# Patient Record
Sex: FEMALE | Race: WHITE
[De-identification: names, ages, dates, MRNs, and addresses within clinical notes are randomized per-mention and may not be internally consistent; named-entity substitution may affect disease eponyms.]

---

## 2023-01-21 ENCOUNTER — HOSPITAL ENCOUNTER (EMERGENCY)
Dept: HOSPITAL 54 - ER | Age: 38
Discharge: HOME | End: 2023-01-21
Payer: MEDICAID

## 2023-01-21 VITALS — SYSTOLIC BLOOD PRESSURE: 139 MMHG | DIASTOLIC BLOOD PRESSURE: 88 MMHG

## 2023-01-21 VITALS — WEIGHT: 165 LBS | HEIGHT: 67 IN | BODY MASS INDEX: 25.9 KG/M2

## 2023-01-21 DIAGNOSIS — F17.210: ICD-10-CM

## 2023-01-21 DIAGNOSIS — G43.909: Primary | ICD-10-CM

## 2023-01-21 DIAGNOSIS — Z88.8: ICD-10-CM

## 2023-01-21 DIAGNOSIS — Z88.5: ICD-10-CM

## 2023-01-21 PROCEDURE — 99284 EMERGENCY DEPT VISIT MOD MDM: CPT

## 2023-01-21 PROCEDURE — 99406 BEHAV CHNG SMOKING 3-10 MIN: CPT

## 2023-01-21 PROCEDURE — 70450 CT HEAD/BRAIN W/O DYE: CPT

## 2023-01-21 NOTE — NUR
PT REFUSED SOME OF MEDS PRESCRIBED. MD MADE AWARE. WAIVER FOR NOT BEING 
PREGNANT SIGNED BY PATIENT. CT SCAN MADE AWARE

## 2023-01-21 NOTE — NUR
MARLISEKEYLA C/O BACK OF HEADACHE AND RAN OUT OF XANAX. PATIENT IS [id number]. ABLE TO 
MAKE NEEDS KNOWN. PLACED COMFORTABLY IN BED. VITALS CHECKED.